# Patient Record
Sex: FEMALE | Race: WHITE | NOT HISPANIC OR LATINO | Employment: OTHER | URBAN - METROPOLITAN AREA
[De-identification: names, ages, dates, MRNs, and addresses within clinical notes are randomized per-mention and may not be internally consistent; named-entity substitution may affect disease eponyms.]

---

## 2019-10-07 ENCOUNTER — NEW REFERRAL (OUTPATIENT)
Dept: URBAN - METROPOLITAN AREA CLINIC 33 | Facility: CLINIC | Age: 64
End: 2019-10-07

## 2019-10-07 VITALS
WEIGHT: 105 LBS | HEIGHT: 64 IN | DIASTOLIC BLOOD PRESSURE: 98 MMHG | SYSTOLIC BLOOD PRESSURE: 140 MMHG | BODY MASS INDEX: 17.93 KG/M2 | HEART RATE: 88 BPM

## 2019-10-07 DIAGNOSIS — H43.812: ICD-10-CM

## 2019-10-07 DIAGNOSIS — H35.373: ICD-10-CM

## 2019-10-07 PROCEDURE — 92250 FUNDUS PHOTOGRAPHY W/I&R: CPT

## 2019-10-07 PROCEDURE — 92004 COMPRE OPH EXAM NEW PT 1/>: CPT

## 2019-10-07 ASSESSMENT — VISUAL ACUITY
OD_PH: 20/50-2
OD_SC: 20/200
OS_SC: 20/20-2

## 2022-03-07 ENCOUNTER — FOLLOW UP (OUTPATIENT)
Dept: URBAN - METROPOLITAN AREA CLINIC 33 | Facility: CLINIC | Age: 67
End: 2022-03-07

## 2022-03-07 VITALS — HEIGHT: 63 IN | BODY MASS INDEX: 18.61 KG/M2 | WEIGHT: 105 LBS

## 2022-03-07 DIAGNOSIS — H43.812: ICD-10-CM

## 2022-03-07 DIAGNOSIS — H35.371: ICD-10-CM

## 2022-03-07 DIAGNOSIS — H35.372: ICD-10-CM

## 2022-03-07 PROCEDURE — 92014 COMPRE OPH EXAM EST PT 1/>: CPT

## 2022-03-07 PROCEDURE — 92134 CPTRZ OPH DX IMG PST SGM RTA: CPT

## 2022-03-07 ASSESSMENT — TONOMETRY
OS_IOP_MMHG: 11
OD_IOP_MMHG: 10

## 2022-03-07 ASSESSMENT — VISUAL ACUITY
OS_SC: 20/40+1
OD_SC: 20/100
OD_PH: 20/60-1

## 2023-10-13 ENCOUNTER — FOLLOW UP (OUTPATIENT)
Dept: URBAN - METROPOLITAN AREA CLINIC 33 | Facility: CLINIC | Age: 68
End: 2023-10-13

## 2023-10-13 DIAGNOSIS — H35.373: ICD-10-CM

## 2023-10-13 DIAGNOSIS — H43.812: ICD-10-CM

## 2023-10-13 DIAGNOSIS — H25.12: ICD-10-CM

## 2023-10-13 PROCEDURE — 92014 COMPRE OPH EXAM EST PT 1/>: CPT

## 2023-10-13 PROCEDURE — 92250 FUNDUS PHOTOGRAPHY W/I&R: CPT | Mod: 59

## 2023-10-13 PROCEDURE — 92134 CPTRZ OPH DX IMG PST SGM RTA: CPT

## 2023-10-13 ASSESSMENT — TONOMETRY
OS_IOP_MMHG: 13
OD_IOP_MMHG: 15

## 2023-10-13 ASSESSMENT — VISUAL ACUITY
OS_SC: 20/40+2
OD_SC: 20/50-2

## 2024-02-14 ENCOUNTER — RX ONLY (OUTPATIENT)
Age: 69
Setting detail: RX ONLY
End: 2024-02-14

## 2024-03-04 ENCOUNTER — RX ONLY (OUTPATIENT)
Age: 69
Setting detail: RX ONLY
End: 2024-03-04

## 2024-04-25 ENCOUNTER — APPOINTMENT (RX ONLY)
Dept: URBAN - METROPOLITAN AREA CLINIC 124 | Facility: CLINIC | Age: 69
Setting detail: DERMATOLOGY
End: 2024-04-25

## 2024-04-25 DIAGNOSIS — L29.89 OTHER PRURITUS: ICD-10-CM

## 2024-04-25 DIAGNOSIS — L81.8 OTHER SPECIFIED DISORDERS OF PIGMENTATION: ICD-10-CM

## 2024-04-25 PROBLEM — L29.8 OTHER PRURITUS: Status: ACTIVE | Noted: 2024-04-25

## 2024-04-25 PROCEDURE — ? PRESCRIPTION

## 2024-04-25 PROCEDURE — ? PRESCRIPTION MEDICATION MANAGEMENT

## 2024-04-25 PROCEDURE — 99203 OFFICE O/P NEW LOW 30 MIN: CPT

## 2024-04-25 PROCEDURE — ? COUNSELING

## 2024-04-25 RX ORDER — TRIAMCINOLONE ACETONIDE 1 MG/G
CREAM TOPICAL BID
Qty: 454 | Refills: 0 | Status: ERX | COMMUNITY
Start: 2024-04-25

## 2024-04-25 RX ADMIN — TRIAMCINOLONE ACETONIDE: 1 CREAM TOPICAL at 00:00

## 2024-04-25 ASSESSMENT — LOCATION DETAILED DESCRIPTION DERM
LOCATION DETAILED: SUPERIOR THORACIC SPINE
LOCATION DETAILED: LEFT POSTERIOR SHOULDER
LOCATION DETAILED: RIGHT POSTERIOR SHOULDER

## 2024-04-25 ASSESSMENT — LOCATION SIMPLE DESCRIPTION DERM
LOCATION SIMPLE: RIGHT SHOULDER
LOCATION SIMPLE: UPPER BACK
LOCATION SIMPLE: LEFT SHOULDER

## 2024-04-25 ASSESSMENT — LOCATION ZONE DERM
LOCATION ZONE: TRUNK
LOCATION ZONE: ARM

## 2024-04-25 NOTE — PROCEDURE: PRESCRIPTION MEDICATION MANAGEMENT
Plan: We will request a copy of path report from [facility]. We will f/u in one month [place].
Detail Level: Zone
Render In Strict Bullet Format?: No
Initiate Treatment: -Moisturize three times a day with ceraVe or cetaphil cream.\\n-Zyrtec tabs once a day when itchy. \\n-Change body wash to oil of Olay. \\n- start TMC 0.1% cream bid x2 weeks.

## 2024-05-23 ENCOUNTER — APPOINTMENT (RX ONLY)
Dept: URBAN - METROPOLITAN AREA CLINIC 124 | Facility: CLINIC | Age: 69
Setting detail: DERMATOLOGY
End: 2024-05-23

## 2024-05-23 DIAGNOSIS — L28.0 LICHEN SIMPLEX CHRONICUS: ICD-10-CM | Status: IMPROVED

## 2024-05-23 PROCEDURE — ? COUNSELING

## 2024-05-23 PROCEDURE — ? DIAGNOSIS COMMENT

## 2024-05-23 PROCEDURE — ? PRESCRIPTION MEDICATION MANAGEMENT

## 2024-05-23 PROCEDURE — 99213 OFFICE O/P EST LOW 20 MIN: CPT

## 2024-05-23 ASSESSMENT — LOCATION ZONE DERM: LOCATION ZONE: ARM

## 2024-05-23 ASSESSMENT — LOCATION DETAILED DESCRIPTION DERM
LOCATION DETAILED: LEFT POSTERIOR SHOULDER
LOCATION DETAILED: RIGHT POSTERIOR SHOULDER

## 2024-05-23 ASSESSMENT — LOCATION SIMPLE DESCRIPTION DERM
LOCATION SIMPLE: RIGHT SHOULDER
LOCATION SIMPLE: LEFT SHOULDER

## 2024-05-23 NOTE — PROCEDURE: PRESCRIPTION MEDICATION MANAGEMENT
Continue Regimen: - Oil of Olay body wash with lukewarm water showers \\n-Moisturize body 2-3 times daily with CeraVe or Eucirn lotion\\n-Zyrtec 10 mg take 1 pill QD for itching
Modify Regimen: Hold off on TMC cream use to aa on shoulders bid for 2 weeks, then discontinue for severe flare ups
Detail Level: Zone
Render In Strict Bullet Format?: No

## 2024-05-23 NOTE — PROCEDURE: DIAGNOSIS COMMENT
Comment: Biopsy proven and outside path scan into patient’s chart
Render Risk Assessment In Note?: no
Detail Level: Simple

## 2024-09-23 ENCOUNTER — APPOINTMENT (RX ONLY)
Dept: URBAN - METROPOLITAN AREA CLINIC 124 | Facility: CLINIC | Age: 69
Setting detail: DERMATOLOGY
End: 2024-09-23

## 2024-09-23 DIAGNOSIS — L28.0 LICHEN SIMPLEX CHRONICUS: ICD-10-CM | Status: STABLE

## 2024-09-23 DIAGNOSIS — Z71.89 OTHER SPECIFIED COUNSELING: ICD-10-CM

## 2024-09-23 DIAGNOSIS — D22 MELANOCYTIC NEVI: ICD-10-CM

## 2024-09-23 DIAGNOSIS — L82.1 OTHER SEBORRHEIC KERATOSIS: ICD-10-CM

## 2024-09-23 DIAGNOSIS — D18.0 HEMANGIOMA: ICD-10-CM

## 2024-09-23 DIAGNOSIS — L81.4 OTHER MELANIN HYPERPIGMENTATION: ICD-10-CM

## 2024-09-23 PROBLEM — D22.5 MELANOCYTIC NEVI OF TRUNK: Status: ACTIVE | Noted: 2024-09-23

## 2024-09-23 PROBLEM — D18.01 HEMANGIOMA OF SKIN AND SUBCUTANEOUS TISSUE: Status: ACTIVE | Noted: 2024-09-23

## 2024-09-23 PROCEDURE — 99213 OFFICE O/P EST LOW 20 MIN: CPT

## 2024-09-23 PROCEDURE — ? PRESCRIPTION MEDICATION MANAGEMENT

## 2024-09-23 PROCEDURE — ? DIAGNOSIS COMMENT

## 2024-09-23 PROCEDURE — ? COUNSELING

## 2024-09-23 ASSESSMENT — LOCATION ZONE DERM
LOCATION ZONE: ARM
LOCATION ZONE: LEG
LOCATION ZONE: TRUNK

## 2024-09-23 ASSESSMENT — LOCATION SIMPLE DESCRIPTION DERM
LOCATION SIMPLE: RIGHT THIGH
LOCATION SIMPLE: ABDOMEN
LOCATION SIMPLE: RIGHT SHOULDER
LOCATION SIMPLE: CHEST
LOCATION SIMPLE: LEFT SHOULDER
LOCATION SIMPLE: RIGHT LOWER BACK
LOCATION SIMPLE: LEFT UPPER BACK

## 2024-09-23 ASSESSMENT — LOCATION DETAILED DESCRIPTION DERM
LOCATION DETAILED: EPIGASTRIC SKIN
LOCATION DETAILED: RIGHT POSTERIOR SHOULDER
LOCATION DETAILED: RIGHT ANTERIOR PROXIMAL THIGH
LOCATION DETAILED: MIDDLE STERNUM
LOCATION DETAILED: LEFT INFERIOR UPPER BACK
LOCATION DETAILED: LEFT POSTERIOR SHOULDER
LOCATION DETAILED: RIGHT INFERIOR LATERAL MIDBACK

## 2024-09-23 NOTE — PROCEDURE: PRESCRIPTION MEDICATION MANAGEMENT
Continue Regimen: - Oil of Olay body wash with lukewarm water showers \\n-Moisturize body 2-3 times daily with CeraVe or Eucirn lotion\\n-Zyrtec 10 mg take 1 pill QD for itching
Modify Regimen: Hold off on TMC cream use to aa on shoulders bid for 2 weeks, then discontinue for severe flare ups only
Plan: Per patient she has not use TMC since she was seen last time
Detail Level: Zone
Render In Strict Bullet Format?: No